# Patient Record
Sex: FEMALE | Race: WHITE | Employment: FULL TIME | URBAN - METROPOLITAN AREA
[De-identification: names, ages, dates, MRNs, and addresses within clinical notes are randomized per-mention and may not be internally consistent; named-entity substitution may affect disease eponyms.]

---

## 2017-01-18 ENCOUNTER — OFFICE VISIT (OUTPATIENT)
Dept: FAMILY MEDICINE CLINIC | Facility: CLINIC | Age: 57
End: 2017-01-18

## 2017-01-18 VITALS
RESPIRATION RATE: 16 BRPM | WEIGHT: 124.38 LBS | HEIGHT: 67 IN | HEART RATE: 62 BPM | DIASTOLIC BLOOD PRESSURE: 76 MMHG | SYSTOLIC BLOOD PRESSURE: 132 MMHG | BODY MASS INDEX: 19.52 KG/M2 | TEMPERATURE: 98 F

## 2017-01-18 DIAGNOSIS — F41.9 ANXIETY: ICD-10-CM

## 2017-01-18 DIAGNOSIS — G47.9 SLEEP DISTURBANCE: ICD-10-CM

## 2017-01-18 DIAGNOSIS — R63.4 WEIGHT LOSS: ICD-10-CM

## 2017-01-18 DIAGNOSIS — Z51.81 ENCOUNTER FOR THERAPEUTIC DRUG MONITORING: ICD-10-CM

## 2017-01-18 DIAGNOSIS — Z12.31 SCREENING MAMMOGRAM, ENCOUNTER FOR: ICD-10-CM

## 2017-01-18 DIAGNOSIS — E05.00 GRAVES DISEASE: ICD-10-CM

## 2017-01-18 DIAGNOSIS — F17.200 TOBACCO USE DISORDER: ICD-10-CM

## 2017-01-18 DIAGNOSIS — E03.8 SECONDARY HYPOTHYROIDISM: Primary | ICD-10-CM

## 2017-01-18 DIAGNOSIS — E89.0 HX OF PARTIAL THYROIDECTOMY: ICD-10-CM

## 2017-01-18 PROCEDURE — 99214 OFFICE O/P EST MOD 30 MIN: CPT | Performed by: FAMILY MEDICINE

## 2017-01-18 RX ORDER — LEVOTHYROXINE AND LIOTHYRONINE 19; 4.5 UG/1; UG/1
30 TABLET ORAL DAILY
Qty: 90 TABLET | Refills: 1 | Status: SHIPPED | OUTPATIENT
Start: 2017-01-18 | End: 2017-03-09 | Stop reason: DRUGHIGH

## 2017-01-18 RX ORDER — ALPRAZOLAM 0.5 MG/1
0.5 TABLET, ORALLY DISINTEGRATING ORAL NIGHTLY PRN
Qty: 90 TABLET | Refills: 0 | Status: SHIPPED | OUTPATIENT
Start: 2017-01-18 | End: 2017-04-18

## 2017-01-18 NOTE — PROGRESS NOTES
Pt here for thyroid management for hypothyroidism. 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 Quit that job.  Around holidays, got really rashes  RESPIRATORY: denies shortness of breath with exertion  CARDIOVASCULAR: denies chest pain on exertion  GI: denies abdominal pain and denies heartburn; loss of appetite with stress and lost 20 pounds this year  ENDO: has had Graves and feels it is fl Take 1 tablet (30 mg total) by mouth daily.  -     TSH and Free T4 [E]; Future  -     Thyroid peroxidase & thyroglobulin ab [E]; Future    Graves disease  -     US THYROID (SPS=[id number]); Future  -     thyroid 30 MG Oral Tab;  Take 1 tablet (30 mg total) by mo

## 2017-01-18 NOTE — PATIENT INSTRUCTIONS
Gideon Cavanaugh you were seen for thyroid concerns. Get an US to assess residual thyroid tissue. Stop the Levothyroxine and start on low dose [place] 30mg daily. Do labs in late February as well. I refilled the Xanax to help with anxiety as well.   Eat three meals · Take any medicines you’re prescribed as directed. · Take your medicine at the same times each day. · Use a pillbox labeled with the days of the week. This will help you remember to take your medicine each day.   · Tell your provider if you have any side There are a number of causes of hypothyroidism. A common cause is Hashimoto’s disease. This disease causes the body’s own immune system to attack the thyroid gland.  When you have certain treatments, such as surgery to remove the thyroid gland, this can als · If you see other healthcare providers, be sure to let them know about your thyroid problem. Follow-up care  See your healthcare provider for checkups as advised. You may need regular tests to check the level of thyroid hormone in your blood.   When to se

## 2017-01-19 ENCOUNTER — HOSPITAL ENCOUNTER (OUTPATIENT)
Dept: MAMMOGRAPHY | Age: 57
Discharge: HOME | End: 2017-01-19
Attending: FAMILY MEDICINE
Payer: COMMERCIAL

## 2017-01-19 ENCOUNTER — HOSPITAL ENCOUNTER (OUTPATIENT)
Dept: ULTRASOUND IMAGING | Age: 57
Discharge: HOME | End: 2017-01-19
Attending: FAMILY MEDICINE
Payer: COMMERCIAL

## 2017-01-19 DIAGNOSIS — Z12.31 SCREENING MAMMOGRAM, ENCOUNTER FOR: ICD-10-CM

## 2017-01-19 DIAGNOSIS — E05.00 GRAVES DISEASE: ICD-10-CM

## 2017-01-19 DIAGNOSIS — E89.0 HX OF PARTIAL THYROIDECTOMY: ICD-10-CM

## 2017-01-19 DIAGNOSIS — E03.8 SECONDARY HYPOTHYROIDISM: ICD-10-CM

## 2017-01-19 PROCEDURE — 77067 SCR MAMMO BI INCL CAD: CPT

## 2017-01-19 PROCEDURE — 76536 US EXAM OF HEAD AND NECK: CPT

## 2017-01-19 NOTE — PROGRESS NOTES
Quick Note:    Please call pt with normal results of thyroid US of left lobe with past right lobe removed and repeat as needed --- Dr. Zapata Fraction  ______

## 2017-01-19 NOTE — PROGRESS NOTES
Quick Note:    Please call pt with normal results of mammogram and repeat annually as directed--- Dr. Francy Xiong  ______

## 2017-01-28 ENCOUNTER — OFFICE VISIT (OUTPATIENT)
Dept: FAMILY MEDICINE CLINIC | Facility: CLINIC | Age: 57
End: 2017-01-28

## 2017-01-28 VITALS
TEMPERATURE: 98 F | RESPIRATION RATE: 18 BRPM | HEIGHT: 67 IN | BODY MASS INDEX: 19.46 KG/M2 | WEIGHT: 124 LBS | SYSTOLIC BLOOD PRESSURE: 108 MMHG | HEART RATE: 67 BPM | DIASTOLIC BLOOD PRESSURE: 68 MMHG | OXYGEN SATURATION: 97 %

## 2017-01-28 DIAGNOSIS — J01.00 ACUTE NON-RECURRENT MAXILLARY SINUSITIS: Primary | ICD-10-CM

## 2017-01-28 DIAGNOSIS — F17.200 SMOKER: ICD-10-CM

## 2017-01-28 PROCEDURE — 99213 OFFICE O/P EST LOW 20 MIN: CPT | Performed by: NURSE PRACTITIONER

## 2017-01-28 RX ORDER — AZITHROMYCIN 250 MG/1
TABLET, FILM COATED ORAL
Qty: 6 TABLET | Refills: 0 | Status: SHIPPED | OUTPATIENT
Start: 2017-01-28 | End: 2017-03-09

## 2017-01-28 NOTE — PROGRESS NOTES
CHIEF COMPLAINT:   Patient presents with:  Sinus Problem: head pressure, draingage, cough      HPI:   Nerissa Macedo is a 49year old female who presents for cold symptoms for  3  weeks.  Symptoms have progressed into sinus congestion and been worsening s GI: denies N/V/C or abdominal pain  NEURO: + sinus headaches. No numbness or tingling in face.     EXAM:   /68 mmHg  Pulse 67  Temp(Src) 97.7 °F (36.5 °C) (Oral)  Resp 18  Ht 67\"  Wt 124 lb  BMI 19.42 kg/m2  SpO2 97%  GENERAL: well developed, well n Sinusitis (Antibiotic Treatment)    The sinuses are air-filled spaces within the bones of the face. They connect to the inside of the nose. Sinusitis is an inflammation of the tissue lining the sinus cavity. Sinus inflammation can occur during a cold.  It c · Do not use nasal rinses or irrigation during an acute sinus infection, unless told to by your health care provider. Rinsing may spread the infection to other sinuses.   · Use acetaminophen or ibuprofen to control pain, unless another pain medicine was pre

## 2017-03-06 ENCOUNTER — APPOINTMENT (OUTPATIENT)
Dept: LAB | Age: 57
End: 2017-03-06
Attending: FAMILY MEDICINE
Payer: COMMERCIAL

## 2017-03-06 DIAGNOSIS — E05.00 GRAVES DISEASE: ICD-10-CM

## 2017-03-06 DIAGNOSIS — E03.8 SECONDARY HYPOTHYROIDISM: ICD-10-CM

## 2017-03-06 DIAGNOSIS — E89.0 HX OF PARTIAL THYROIDECTOMY: ICD-10-CM

## 2017-03-06 LAB
FREE T4: 0.5 NG/DL (ref 0.9–1.8)
TSI SER-ACNC: 5.29 MIU/ML (ref 0.35–5.5)

## 2017-03-06 PROCEDURE — 86800 THYROGLOBULIN ANTIBODY: CPT

## 2017-03-06 PROCEDURE — 36415 COLL VENOUS BLD VENIPUNCTURE: CPT

## 2017-03-06 PROCEDURE — 86376 MICROSOMAL ANTIBODY EACH: CPT

## 2017-03-06 PROCEDURE — 84439 ASSAY OF FREE THYROXINE: CPT

## 2017-03-06 PROCEDURE — 84443 ASSAY THYROID STIM HORMONE: CPT

## 2017-03-07 LAB
ANTI-THYROGLOBULIN: <15 U/ML (ref ?–60)
ANTI-THYROPEROXIDASE: 60 U/ML (ref ?–60)

## 2017-03-07 NOTE — PROGRESS NOTES
Quick Note:    Pt has OV this week to discuss and will need to adjust thyroid medication --antibodies better but still with elevation-- Dr. Luis Carlos Che  ______

## 2017-03-07 NOTE — PROGRESS NOTES
Quick Note:    Will discuss with pt need to increase her thyroid medication from 30 to 45mg daily at her OV this week--- Dr. Nina Garcia  ______

## 2017-03-09 ENCOUNTER — OFFICE VISIT (OUTPATIENT)
Dept: FAMILY MEDICINE CLINIC | Facility: CLINIC | Age: 57
End: 2017-03-09

## 2017-03-09 VITALS
WEIGHT: 125 LBS | HEART RATE: 82 BPM | HEIGHT: 67 IN | DIASTOLIC BLOOD PRESSURE: 70 MMHG | BODY MASS INDEX: 19.62 KG/M2 | SYSTOLIC BLOOD PRESSURE: 118 MMHG | RESPIRATION RATE: 16 BRPM | OXYGEN SATURATION: 98 % | TEMPERATURE: 98 F

## 2017-03-09 DIAGNOSIS — E05.90 PRIMARY HYPERTHYROIDISM: ICD-10-CM

## 2017-03-09 DIAGNOSIS — E89.0 HX OF THYROIDECTOMY: ICD-10-CM

## 2017-03-09 DIAGNOSIS — E03.8 SECONDARY HYPOTHYROIDISM: Primary | ICD-10-CM

## 2017-03-09 PROCEDURE — 99213 OFFICE O/P EST LOW 20 MIN: CPT | Performed by: FAMILY MEDICINE

## 2017-03-09 RX ORDER — LEVOTHYROXINE AND LIOTHYRONINE 57; 13.5 UG/1; UG/1
90 TABLET ORAL DAILY
Qty: 10 TABLET | Refills: 0 | Status: SHIPPED | OUTPATIENT
Start: 2017-03-09 | End: 2017-03-19

## 2017-03-09 RX ORDER — LEVOTHYROXINE AND LIOTHYRONINE 57; 13.5 UG/1; UG/1
90 TABLET ORAL DAILY
Qty: 90 TABLET | Refills: 0 | Status: SHIPPED | OUTPATIENT
Start: 2017-03-09 | End: 2017-06-07

## 2017-03-09 NOTE — PATIENT INSTRUCTIONS
Lay Javier you were seen for thyroid management. Your TSH was over 5 on 60mg of [place]. I advise now going to 90 mg of [place] and labs on or after 3/18/17. Take vitamins with dinner as well. Our office will call you with results as well.  Take care, Dr. Vy Ngo · Anti-thyroid medicine. This can reduce the amount of thyroid hormone made by your thyroid gland. Reactions from this medicine are rare.  But in some cases, it can cause a dangerous drop in white blood cells, and liver damage. Talk with your healthcare pro · Surgery. This may be done to treat nodules that are causing symptoms, such as choking, or trouble swallowing. Surgery is done by removing part or all of your thyroid gland.  Surgery to remove cancerous nodules may be followed by radioiodine iodine ablatio

## 2017-03-09 NOTE — PROGRESS NOTES
Pt admits that she doubled Tofte from 30 to 60 mg early Feb and did labs 3/6/17 on the 60 mg which she did not inform me of and despite this, her TSH is still not controlled.  She has a history of primary graves and had thyroid surgery 1986 and now with ch well developed, well nourished,in no apparent distress  SKIN: no rashes,no suspicious lesions  HEENT: atraumatic, normocephalic,ears and throat are clear  NECK: supple,no adenopathy,no bruits, scar base of neck from thyroid surgery history    LUNGS: clear

## 2017-03-20 ENCOUNTER — APPOINTMENT (OUTPATIENT)
Dept: LAB | Age: 57
End: 2017-03-20
Attending: FAMILY MEDICINE
Payer: COMMERCIAL

## 2017-03-20 DIAGNOSIS — E03.8 SECONDARY HYPOTHYROIDISM: ICD-10-CM

## 2017-03-20 DIAGNOSIS — E89.0 HX OF THYROIDECTOMY: ICD-10-CM

## 2017-03-20 DIAGNOSIS — E05.90 PRIMARY HYPERTHYROIDISM: ICD-10-CM

## 2017-03-20 LAB
FREE T4: 0.7 NG/DL (ref 0.9–1.8)
TSI SER-ACNC: 1.08 MIU/ML (ref 0.35–5.5)

## 2017-03-20 PROCEDURE — 84443 ASSAY THYROID STIM HORMONE: CPT

## 2017-03-20 PROCEDURE — 84439 ASSAY OF FREE THYROXINE: CPT

## 2017-03-20 PROCEDURE — 36415 COLL VENOUS BLD VENIPUNCTURE: CPT

## 2017-03-21 ENCOUNTER — TELEPHONE (OUTPATIENT)
Dept: FAMILY MEDICINE CLINIC | Facility: CLINIC | Age: 57
End: 2017-03-21

## 2017-03-21 DIAGNOSIS — E03.9 ACQUIRED HYPOTHYROIDISM: Primary | ICD-10-CM

## 2017-03-21 NOTE — PROGRESS NOTES
Quick Note:    Please call pt with improved results with TSH better on the Thyroid 90mg dose and repeat again in 6 months advised for both TSH and free T4 and order for pt--- Dr. Salina English  ______

## 2017-03-21 NOTE — TELEPHONE ENCOUNTER
----- Message from Shaggy Aparicio DO sent at 3/21/2017  2:07 PM CDT -----  Please call pt with improved results with TSH better on the Thyroid 90mg dose and repeat again in 6 months advised for both TSH and free T4 and order for pt---  Dr. Montez Vail

## 2024-04-01 ENCOUNTER — APPOINTMENT (OUTPATIENT)
Dept: URBAN - METROPOLITAN AREA CLINIC 247 | Age: 64
Setting detail: DERMATOLOGY
End: 2024-04-01

## 2024-04-01 DIAGNOSIS — L57.8 OTHER SKIN CHANGES DUE TO CHRONIC EXPOSURE TO NONIONIZING RADIATION: ICD-10-CM

## 2024-04-01 DIAGNOSIS — L72.8 OTHER FOLLICULAR CYSTS OF THE SKIN AND SUBCUTANEOUS TISSUE: ICD-10-CM

## 2024-04-01 DIAGNOSIS — D22 MELANOCYTIC NEVI: ICD-10-CM

## 2024-04-01 DIAGNOSIS — L72.0 EPIDERMAL CYST: ICD-10-CM

## 2024-04-01 PROBLEM — D22.39 MELANOCYTIC NEVI OF OTHER PARTS OF FACE: Status: ACTIVE | Noted: 2024-04-01

## 2024-04-01 PROCEDURE — 99203 OFFICE O/P NEW LOW 30 MIN: CPT

## 2024-04-01 PROCEDURE — OTHER MIPS QUALITY: OTHER

## 2024-04-01 PROCEDURE — OTHER COUNSELING: OTHER

## 2024-04-01 ASSESSMENT — LOCATION SIMPLE DESCRIPTION DERM
LOCATION SIMPLE: RIGHT CHEEK
LOCATION SIMPLE: LEFT CHEEK
LOCATION SIMPLE: SCALP

## 2024-04-01 ASSESSMENT — LOCATION DETAILED DESCRIPTION DERM
LOCATION DETAILED: RIGHT CENTRAL POSTAURICULAR SKIN
LOCATION DETAILED: RIGHT CENTRAL MALAR CHEEK
LOCATION DETAILED: LEFT INFERIOR CENTRAL MALAR CHEEK
LOCATION DETAILED: LEFT SUPERIOR PREAURICULAR CHEEK

## 2024-04-01 ASSESSMENT — LOCATION ZONE DERM
LOCATION ZONE: FACE
LOCATION ZONE: SCALP

## 2024-04-01 NOTE — PROCEDURE: MIPS QUALITY
Quality 226: Preventive Care And Screening: Tobacco Use: Screening And Cessation Intervention: Patient screened for tobacco use and is an ex/non-smoker
Detail Level: Detailed
Quality 47: Advance Care Plan: Advance care planning not documented, reason not otherwise specified.
Quality 358: Patient-Centered Surgical Risk Assessment And Communication: A patient-specific risk assessment with a risk calculator was not completed or communicated to patient and/or family.

## (undated) NOTE — MR AVS SNAPSHOT
[address]  [address] [facility]. [phone]  [phone]               Thank you for choosing us for your health care visit with WILLIAMS Marsh.   We are glad to serve you and happy to provide you with this summary of · Over-the-counter decongestants may be used unless a similar medicine was prescribed. Nasal sprays work the fastest. Use one that contains phenylephrine or oxymetazoline. First blow the nose gently. Then use the spray.  Do not use these medicines [place] [address]. All rights reserved. This information is not intended as a substitute for professional medical care. Always follow your healthcare professional's instructions. Follow Up with Our Office     Return if symptoms worsen or fail to improve. discharge instructions in [employer] by going to Visits < Admission Summaries. If you've been to the Emergency Department or your doctor's office, you can view your past visit information in [employer] by going to Visits < Visit Summaries. [employer] questions?

## (undated) NOTE — LETTER
10/21/17        [address] [facility]      Dear Julian Prince records indicate that you have outstanding lab work and or testing that was ordered for you and has not yet been completed:          TSH and Free T4 [E]  To

## (undated) NOTE — MR AVS SNAPSHOT
[facility] [address] [place] [address] [address] [facility]  [address] [id number] [phone]               Thank you for choosing us for your health care visit with Feliciano Richardson DO.   We are glad to serve you and happy to provide you wi condition. But treatment can relieve most or all of your symptoms caused by the low thyroid hormone levels. Treatment is done with daily thyroid hormone pills. Thyroid hormone pills replace the hormone your thyroid doesn’t make.  You will likely need to zhanna · Beta-blockers. The treatments above may be used alone or with beta-blockers. These are medicines that can reduce symptoms caused by too much thyroid hormone. In some case, symptoms from too much thyroid hormone can be controlled by beta-blockers alone. © 6315-8926 [address], [address]. All rights reserved. This information is not intended as a substitute for professional medical care. Always follow your healthcare professional's instructions.              Al Phone:  [phone] - thyroid 90 MG Tabs            MyChart     Visit [facility]  You can access your MyChart to more actively manage your health care and view more details from this visit by going to [email or web address].   If you've recently ha

## (undated) NOTE — MR AVS SNAPSHOT
[address] [facility]  [address] [id number] [phone]               Thank you for choosing us for your health care visit with [facility], .   We are glad to serve you and happy to provide you wi · Nervousness, anxiety, irritability  · Shaking (tremors) that affects the hands and fingers  · Weight loss despite having a normal or increased appetite  · Low tolerance to heat  · Sweating more than normal  · Fast or irregular heartbeat  · Lighter or irr Call 911 right away if any of these occur:  · Fainting  · Chest pain  · Shortness of breath or trouble breathing  Date Last Reviewed: 8/24/2015  © 1247-8842 The [address] [facility], [address] [place] [address]. All rights reserved.  This inf provider. This is most often 1 pill a day on an empty stomach. Use a pillbox labeled with the days of the week. This will help you remember to take your pill each day.   · Don’t take products that contain iron and calcium or antacids within 4 hours of takin 132/76 mmHg 62 98 °F (36.7 °C) (Temporal) 67\" 124 lb 6.4 oz 19.48 kg/m2         Current Medications          This list is accurate as of: 1/18/17 11:59 PM.  Always use your most recent med list.                Albuterol Sulfate  (90 BASE) MCG/ACT Wednesday January 18, 2017     Imaging:  US THYROID ([id number])    Instructions: To schedule an appointment for your radiology test please call [facility] Scheduling at [phone].          dELiAs     Visit dELiAs  You can access your [facility]